# Patient Record
Sex: MALE | Race: WHITE | NOT HISPANIC OR LATINO
[De-identification: names, ages, dates, MRNs, and addresses within clinical notes are randomized per-mention and may not be internally consistent; named-entity substitution may affect disease eponyms.]

---

## 2017-05-08 ENCOUNTER — APPOINTMENT (OUTPATIENT)
Dept: CARDIOLOGY | Facility: CLINIC | Age: 82
End: 2017-05-08

## 2017-05-24 ENCOUNTER — APPOINTMENT (OUTPATIENT)
Dept: CARDIOLOGY | Facility: CLINIC | Age: 82
End: 2017-05-24

## 2017-06-02 ENCOUNTER — APPOINTMENT (OUTPATIENT)
Dept: CARDIOLOGY | Facility: CLINIC | Age: 82
End: 2017-06-02

## 2017-08-14 ENCOUNTER — APPOINTMENT (OUTPATIENT)
Dept: CARDIOLOGY | Facility: CLINIC | Age: 82
End: 2017-08-14
Payer: MEDICARE

## 2017-08-14 PROCEDURE — 93288 INTERROG EVL PM/LDLS PM IP: CPT

## 2017-10-03 ENCOUNTER — APPOINTMENT (OUTPATIENT)
Dept: CARDIOLOGY | Facility: CLINIC | Age: 82
End: 2017-10-03
Payer: MEDICARE

## 2017-10-03 PROCEDURE — 99214 OFFICE O/P EST MOD 30 MIN: CPT

## 2017-11-06 ENCOUNTER — APPOINTMENT (OUTPATIENT)
Dept: CARDIOLOGY | Facility: CLINIC | Age: 82
End: 2017-11-06
Payer: MEDICARE

## 2017-11-06 PROCEDURE — 93280 PM DEVICE PROGR EVAL DUAL: CPT

## 2017-12-12 ENCOUNTER — RX RENEWAL (OUTPATIENT)
Age: 82
End: 2017-12-12

## 2018-01-18 ENCOUNTER — CLINICAL ADVICE (OUTPATIENT)
Age: 83
End: 2018-01-18

## 2018-01-29 ENCOUNTER — APPOINTMENT (OUTPATIENT)
Dept: CARDIOLOGY | Facility: CLINIC | Age: 83
End: 2018-01-29

## 2018-04-26 ENCOUNTER — APPOINTMENT (OUTPATIENT)
Dept: CARDIOLOGY | Facility: CLINIC | Age: 83
End: 2018-04-26
Payer: MEDICARE

## 2018-04-26 VITALS
DIASTOLIC BLOOD PRESSURE: 78 MMHG | OXYGEN SATURATION: 99 % | RESPIRATION RATE: 16 BRPM | WEIGHT: 190 LBS | SYSTOLIC BLOOD PRESSURE: 132 MMHG | BODY MASS INDEX: 28.79 KG/M2 | HEART RATE: 76 BPM | HEIGHT: 68 IN

## 2018-04-26 DIAGNOSIS — Z87.438 PERSONAL HISTORY OF OTHER DISEASES OF MALE GENITAL ORGANS: ICD-10-CM

## 2018-04-26 DIAGNOSIS — Z85.46 PERSONAL HISTORY OF MALIGNANT NEOPLASM OF PROSTATE: ICD-10-CM

## 2018-04-26 DIAGNOSIS — Z86.79 PERSONAL HISTORY OF OTHER DISEASES OF THE CIRCULATORY SYSTEM: ICD-10-CM

## 2018-04-26 DIAGNOSIS — M25.50 PAIN IN UNSPECIFIED JOINT: ICD-10-CM

## 2018-04-26 DIAGNOSIS — Z86.39 PERSONAL HISTORY OF OTHER ENDOCRINE, NUTRITIONAL AND METABOLIC DISEASE: ICD-10-CM

## 2018-04-26 PROCEDURE — 99215 OFFICE O/P EST HI 40 MIN: CPT

## 2018-05-14 ENCOUNTER — APPOINTMENT (OUTPATIENT)
Dept: CARDIOLOGY | Facility: CLINIC | Age: 83
End: 2018-05-14
Payer: MEDICARE

## 2018-05-14 PROCEDURE — 93306 TTE W/DOPPLER COMPLETE: CPT

## 2018-05-14 PROCEDURE — 93280 PM DEVICE PROGR EVAL DUAL: CPT

## 2018-05-15 ENCOUNTER — APPOINTMENT (OUTPATIENT)
Dept: CARDIOLOGY | Facility: CLINIC | Age: 83
End: 2018-05-15

## 2018-05-24 ENCOUNTER — NON-APPOINTMENT (OUTPATIENT)
Age: 83
End: 2018-05-24

## 2018-05-24 ENCOUNTER — APPOINTMENT (OUTPATIENT)
Dept: CARDIOLOGY | Facility: CLINIC | Age: 83
End: 2018-05-24
Payer: MEDICARE

## 2018-05-24 VITALS
HEART RATE: 78 BPM | SYSTOLIC BLOOD PRESSURE: 122 MMHG | WEIGHT: 195 LBS | BODY MASS INDEX: 29.55 KG/M2 | HEIGHT: 68 IN | DIASTOLIC BLOOD PRESSURE: 78 MMHG

## 2018-05-24 PROCEDURE — 93000 ELECTROCARDIOGRAM COMPLETE: CPT

## 2018-05-24 PROCEDURE — 99214 OFFICE O/P EST MOD 30 MIN: CPT

## 2018-05-24 RX ORDER — SIMVASTATIN 20 MG/1
20 TABLET, FILM COATED ORAL
Refills: 0 | Status: DISCONTINUED | COMMUNITY
End: 2018-05-24

## 2018-05-29 PROCEDURE — 93224 XTRNL ECG REC UP TO 48 HRS: CPT

## 2018-05-31 ENCOUNTER — APPOINTMENT (OUTPATIENT)
Dept: CARDIOLOGY | Facility: CLINIC | Age: 83
End: 2018-05-31

## 2018-06-04 ENCOUNTER — RECORD ABSTRACTING (OUTPATIENT)
Age: 83
End: 2018-06-04

## 2018-06-05 ENCOUNTER — RX RENEWAL (OUTPATIENT)
Age: 83
End: 2018-06-05

## 2018-06-05 ENCOUNTER — RECORD ABSTRACTING (OUTPATIENT)
Age: 83
End: 2018-06-05

## 2018-06-05 ENCOUNTER — APPOINTMENT (OUTPATIENT)
Dept: CARDIOLOGY | Facility: CLINIC | Age: 83
End: 2018-06-05
Payer: MEDICARE

## 2018-06-05 VITALS
BODY MASS INDEX: 27.89 KG/M2 | OXYGEN SATURATION: 95 % | HEIGHT: 68 IN | DIASTOLIC BLOOD PRESSURE: 80 MMHG | HEART RATE: 74 BPM | SYSTOLIC BLOOD PRESSURE: 114 MMHG | WEIGHT: 184 LBS

## 2018-06-05 VITALS
OXYGEN SATURATION: 95 % | BODY MASS INDEX: 27.89 KG/M2 | SYSTOLIC BLOOD PRESSURE: 114 MMHG | HEIGHT: 68 IN | DIASTOLIC BLOOD PRESSURE: 80 MMHG | WEIGHT: 184 LBS | HEART RATE: 74 BPM

## 2018-06-05 DIAGNOSIS — I36.8 OTHER NONRHEUMATIC TRICUSPID VALVE DISORDERS: ICD-10-CM

## 2018-06-05 PROCEDURE — 99214 OFFICE O/P EST MOD 30 MIN: CPT

## 2018-06-05 PROCEDURE — 93000 ELECTROCARDIOGRAM COMPLETE: CPT

## 2018-06-05 RX ORDER — HYDROCODONE POLISTIREX AND CHLORPHENIRAMINE POLISTIREX 10; 8 MG/5ML; MG/5ML
10-8 SUSPENSION, EXTENDED RELEASE ORAL
Qty: 60 | Refills: 0 | Status: DISCONTINUED | COMMUNITY
Start: 2017-12-20 | End: 2018-06-05

## 2018-06-05 RX ORDER — PREDNISONE 10 MG/1
10 TABLET ORAL
Qty: 30 | Refills: 0 | Status: DISCONTINUED | COMMUNITY
Start: 2018-04-26 | End: 2018-06-05

## 2018-06-05 RX ORDER — TRAMADOL HYDROCHLORIDE AND ACETAMINOPHEN 37.5; 325 MG/1; MG/1
37.5-325 TABLET, FILM COATED ORAL
Qty: 20 | Refills: 0 | Status: DISCONTINUED | COMMUNITY
Start: 2018-02-08 | End: 2018-06-05

## 2018-06-05 RX ORDER — POLYETHYLENE GLYCOL 3350 17 G/17G
17 POWDER, FOR SOLUTION ORAL
Qty: 14 | Refills: 0 | Status: DISCONTINUED | COMMUNITY
Start: 2018-04-23 | End: 2018-06-05

## 2018-06-05 RX ORDER — METHYLPREDNISOLONE 4 MG/1
4 TABLET ORAL
Qty: 21 | Refills: 0 | Status: DISCONTINUED | COMMUNITY
Start: 2018-04-05 | End: 2018-06-05

## 2018-06-05 RX ORDER — MULTIVIT-MIN/IRON/FOLIC ACID/K 18-600-40
400 CAPSULE ORAL
Refills: 0 | Status: DISCONTINUED | COMMUNITY
End: 2018-06-05

## 2018-06-05 RX ORDER — PREDNISONE 20 MG/1
20 TABLET ORAL
Qty: 40 | Refills: 0 | Status: DISCONTINUED | COMMUNITY
Start: 2018-04-30 | End: 2018-06-05

## 2018-06-06 PROBLEM — I36.8 OTHER NONRHEUMATIC TRICUSPID VALVE DISORDERS: Status: ACTIVE | Noted: 2018-04-26

## 2018-06-07 ENCOUNTER — RX RENEWAL (OUTPATIENT)
Age: 83
End: 2018-06-07

## 2018-06-19 ENCOUNTER — APPOINTMENT (OUTPATIENT)
Dept: ELECTROPHYSIOLOGY | Facility: CLINIC | Age: 83
End: 2018-06-19

## 2018-08-17 ENCOUNTER — RECORD ABSTRACTING (OUTPATIENT)
Age: 83
End: 2018-08-17

## 2018-08-20 ENCOUNTER — APPOINTMENT (OUTPATIENT)
Dept: CARDIOLOGY | Facility: CLINIC | Age: 83
End: 2018-08-20
Payer: MEDICARE

## 2018-08-20 PROCEDURE — 93280 PM DEVICE PROGR EVAL DUAL: CPT

## 2018-09-04 ENCOUNTER — MEDICATION RENEWAL (OUTPATIENT)
Age: 83
End: 2018-09-04

## 2018-11-14 ENCOUNTER — APPOINTMENT (OUTPATIENT)
Dept: CARDIOLOGY | Facility: CLINIC | Age: 83
End: 2018-11-14
Payer: MEDICARE

## 2018-11-14 PROCEDURE — 93280 PM DEVICE PROGR EVAL DUAL: CPT

## 2018-11-14 NOTE — PROCEDURE
[See Scanned Paceart Report] : See scanned paceart report [de-identified] : See Paceart report 11/14/18

## 2018-11-27 ENCOUNTER — MEDICATION RENEWAL (OUTPATIENT)
Age: 83
End: 2018-11-27

## 2018-11-27 ENCOUNTER — APPOINTMENT (OUTPATIENT)
Dept: CARDIOLOGY | Facility: CLINIC | Age: 83
End: 2018-11-27
Payer: MEDICARE

## 2018-11-27 ENCOUNTER — RECORD ABSTRACTING (OUTPATIENT)
Age: 83
End: 2018-11-27

## 2018-11-27 VITALS
WEIGHT: 196 LBS | OXYGEN SATURATION: 98 % | HEART RATE: 79 BPM | SYSTOLIC BLOOD PRESSURE: 120 MMHG | HEIGHT: 68 IN | BODY MASS INDEX: 29.7 KG/M2 | DIASTOLIC BLOOD PRESSURE: 80 MMHG

## 2018-11-27 DIAGNOSIS — N40.0 BENIGN PROSTATIC HYPERPLASIA WITHOUT LOWER URINARY TRACT SYMPMS: ICD-10-CM

## 2018-11-27 PROCEDURE — 99214 OFFICE O/P EST MOD 30 MIN: CPT

## 2018-11-27 RX ORDER — MAGNESIUM OXIDE 400 MG
400 (241.3 MG) TABLET ORAL
Qty: 28 | Refills: 0 | Status: DISCONTINUED | COMMUNITY
Start: 2018-04-23 | End: 2018-11-27

## 2018-11-27 RX ORDER — CLOTRIMAZOLE AND BETAMETHASONE DIPROPIONATE 10; .5 MG/G; MG/G
1-0.05 CREAM TOPICAL TWICE DAILY
Qty: 60 | Refills: 3 | Status: ACTIVE | COMMUNITY
Start: 2017-05-19 | End: 1900-01-01

## 2018-12-10 ENCOUNTER — RX RENEWAL (OUTPATIENT)
Age: 83
End: 2018-12-10

## 2019-02-15 ENCOUNTER — APPOINTMENT (OUTPATIENT)
Dept: CARDIOLOGY | Facility: CLINIC | Age: 84
End: 2019-02-15
Payer: MEDICARE

## 2019-02-15 PROCEDURE — 93296 REM INTERROG EVL PM/IDS: CPT

## 2019-02-15 PROCEDURE — 93294 REM INTERROG EVL PM/LDLS PM: CPT

## 2019-02-15 NOTE — PROCEDURE
[Pacemaker] : pacemaker [DDDR] : DDDR [Voltage: ___ volts] : Voltage was [unfilled] volts [Longevity: ___ months] : The estimated remaining battery life is [unfilled] months [Lead Imp:  ___ohms] : lead impedance was [unfilled] ohms [Sensing Amplitude ___mv] : sensing amplitude was [unfilled] mv [___V @] : [unfilled] V [___ ms] : [unfilled] ms [None] : none [Apace-Vpace ___ %] : Apace-Vpace [unfilled]% [de-identified] : Medtronic [de-identified] : Sensia [id number] [de-identified] : [id number] [de-identified] : 8-16-10 [de-identified] :  [de-identified] : [employer]\par \par atrium\par sensing 0.50mv\par amplitude 1.50v\par duration 0.40ms\par \par Ventricle\par sensing 4.0mv\par amplitude 2.0v\par duration 0.40ms\par \par Episodes AHR x 5\par longest on 11-18-18 lasting 2hours 45min 13 seconds with avg v rate 76bpm\par fastest avg v rate was on 12-17-18 x 1min 18 seconds with avg v rate 108bpm\par most recent 2-7-19 lasting 1 hour 43 minutes 30 seconds with avg v rate 77bpm\par \par Episodes seem to be atrial fibrillation with controlled v rate\par \par The risk benefit ratio of AC vs novel AC was reviewed with the patient. He would like to start Eliquis 5mg 1 tab po BID. \par He will decrease his Asa to 81mg daily and start OTC PPI for GI protection. \par He will follow closely with his internest in [place], next week. As he is in [place] until May. He verbalizes understanding and the importance of following next week. \par \par Next ppm check in office 3 months to follow ppm in office. \par \par Sincerely,\par \par Shala Gomez PA-C\par patient reviewed and plan advised by supervising physician Dr. Alexander\par \par

## 2019-05-10 ENCOUNTER — MEDICATION RENEWAL (OUTPATIENT)
Age: 84
End: 2019-05-10

## 2019-05-13 ENCOUNTER — MEDICATION RENEWAL (OUTPATIENT)
Age: 84
End: 2019-05-13

## 2019-05-16 ENCOUNTER — MEDICATION RENEWAL (OUTPATIENT)
Age: 84
End: 2019-05-16

## 2019-05-17 ENCOUNTER — APPOINTMENT (OUTPATIENT)
Dept: CARDIOLOGY | Facility: CLINIC | Age: 84
End: 2019-05-17
Payer: MEDICARE

## 2019-05-17 PROCEDURE — 93880 EXTRACRANIAL BILAT STUDY: CPT

## 2019-05-17 PROCEDURE — 93306 TTE W/DOPPLER COMPLETE: CPT

## 2019-05-17 PROCEDURE — 93280 PM DEVICE PROGR EVAL DUAL: CPT

## 2019-05-17 NOTE — PROCEDURE
[Pacemaker] : pacemaker [DDDR] : DDDR [Voltage: ___ volts] : Voltage was [unfilled] volts [Longevity: ___ months] : The estimated remaining battery life is [unfilled] months [Lead Imp:  ___ohms] : lead impedance was [unfilled] ohms [Sensing Amplitude ___mv] : sensing amplitude was [unfilled] mv [___V @] : [unfilled] V [___ ms] : [unfilled] ms [None] : none [Apace-Vpace ___ %] : Apace-Vpace [unfilled]% [No] : not [See Device Printout] : See device printout [Threshold Testing Performed] : Threshold testing was performed [Atrial] : Atrial [Counters Reset] : the counters were reset [Ventricular] : Ventricular [de-identified] : Medtronic [de-identified] : Sensia [id number] [de-identified] : 8-16-10 [de-identified] : [id number] [de-identified] :  [de-identified] : Settings\par \par atrium (auto)\par sensing 0.50mv\par amplitude 1.50v\par duration 0.40ms\par \par Ventricle (auto)\par sensing 4.0mv\par amplitude 2.0v\par duration 0.40ms\par \par Episodes AHR x 1 since last remote check 2/15/19\par Viewed episode, lasted 2h 23min, max v-rate during episode was 148bpm, avg v-rate 80 bpm. Asx. \par On beta blocker and NOAC. \par \par Overall, PAF with controlled v rate.\par \par Next ppm check in office 3 months. Patient prefers in office checks while he is in state. \par \par Sincerely,\par \par ADELINE Zuniga\par Reviewed with supervising MD: Dr. Bari Paulson \par

## 2019-05-22 ENCOUNTER — TRANSCRIPTION ENCOUNTER (OUTPATIENT)
Age: 84
End: 2019-05-22

## 2019-06-14 ENCOUNTER — OTHER (OUTPATIENT)
Age: 84
End: 2019-06-14

## 2019-06-14 ENCOUNTER — MEDICATION RENEWAL (OUTPATIENT)
Age: 84
End: 2019-06-14

## 2019-06-14 RX ORDER — ASPIRIN 81 MG
81 TABLET, DELAYED RELEASE (ENTERIC COATED) ORAL
Refills: 0 | Status: DISCONTINUED | COMMUNITY
End: 2019-06-14

## 2019-06-26 ENCOUNTER — APPOINTMENT (OUTPATIENT)
Dept: CARDIOLOGY | Facility: CLINIC | Age: 84
End: 2019-06-26
Payer: MEDICARE

## 2019-06-26 ENCOUNTER — NON-APPOINTMENT (OUTPATIENT)
Age: 84
End: 2019-06-26

## 2019-06-26 VITALS — HEART RATE: 83 BPM | SYSTOLIC BLOOD PRESSURE: 126 MMHG | DIASTOLIC BLOOD PRESSURE: 70 MMHG | HEIGHT: 68 IN

## 2019-06-26 DIAGNOSIS — R60.9 EDEMA, UNSPECIFIED: ICD-10-CM

## 2019-06-26 DIAGNOSIS — Z86.79 PERSONAL HISTORY OF OTHER DISEASES OF THE CIRCULATORY SYSTEM: ICD-10-CM

## 2019-06-26 PROCEDURE — 99214 OFFICE O/P EST MOD 30 MIN: CPT

## 2019-06-26 PROCEDURE — 93000 ELECTROCARDIOGRAM COMPLETE: CPT

## 2019-06-26 NOTE — DISCUSSION/SUMMARY
[FreeTextEntry1] : Patient will consult Dr. Fajardo regarding gradual decline in hemoglobin as he is on Eliquis. Aspirin has been stopped. If needed, GI consultation is recommended for his anemia. Patient with history of PAF, asymptomatic\par \par History of CAD. Asymptomatic. Normal LV function. Check echocardiogram in future. Previous echocardiogram in May 2018 showed normal LV function.\par \par Lipid profile: Controlled with low-dose statins. No side effects. \par \par Continue current medications

## 2019-06-26 NOTE — PHYSICAL EXAM
[Normal Appearance] : normal appearance [Normal Oropharynx] : normal oropharynx [Normal Conjunctiva] : the conjunctiva exhibited no abnormalities [Normal Jugular Venous V Waves Present] : normal jugular venous V waves present [] : no respiratory distress [Respiration, Rhythm And Depth] : normal respiratory rhythm and effort [Heart Sounds] : normal S1 and S2 [Heart Rate And Rhythm] : heart rate and rhythm were normal [Edema] : no peripheral edema present [Bowel Sounds] : normal bowel sounds [Abdomen Soft] : soft [Abdomen Mass (___ Cm)] : no abdominal mass palpated [Abnormal Walk] : normal gait [Cyanosis, Localized] : no localized cyanosis [Nail Clubbing] : no clubbing of the fingernails [Skin Color & Pigmentation] : normal skin color and pigmentation [Skin Turgor] : normal skin turgor [Oriented To Time, Place, And Person] : oriented to person, place, and time

## 2019-06-26 NOTE — HISTORY OF PRESENT ILLNESS
[FreeTextEntry1] : Alvarez is a pleasant 85-year-old male with history of intermittent atrial fibrillation on liquids, CAD, status post CABG,  nonobstructive carotid disease, hypercholesteremia on statins He has history of coronary artery bypass graft surgery. He had LIMA to LAD, SVG to PDA, OMB and OM2 in 1990. \par \par He denies any chest pain on exertion or at rest. No orthopnea or PND. No fever or chills. \par \par His blood pressure is under good control on present medical regimen. \par \par He was noted to have slight anemia. Patient is on Eliquis. He consulted Dr. Fajardo. Eliquis was  continued. He is off aspirin. He denies bright red bleeding in stools or melena. Last hemoglobin  is 10.2

## 2019-06-26 NOTE — ASSESSMENT
[FreeTextEntry1] : review today:\par \par EKG 06/26/19:\par Carotid ultrasound May 2019: Screws without significant stenosis.\par Pacemaker check May 2019 - Episodes of high atrial rate Suggestive of PAF -

## 2019-08-27 ENCOUNTER — APPOINTMENT (OUTPATIENT)
Dept: CARDIOLOGY | Facility: CLINIC | Age: 84
End: 2019-08-27
Payer: MEDICARE

## 2019-08-27 ENCOUNTER — TRANSCRIPTION ENCOUNTER (OUTPATIENT)
Age: 84
End: 2019-08-27

## 2019-08-27 PROCEDURE — 93280 PM DEVICE PROGR EVAL DUAL: CPT

## 2019-08-27 RX ORDER — DOXAZOSIN 4 MG/1
4 TABLET ORAL
Qty: 90 | Refills: 3 | Status: ACTIVE | COMMUNITY
Start: 2018-04-26 | End: 1900-01-01

## 2019-08-27 NOTE — PROCEDURE
[No] : not [See Device Printout] : See device printout [Pacemaker] : pacemaker [DDDR] : DDDR [Voltage: ___ volts] : Voltage was [unfilled] volts [Atrial] : Atrial [Threshold Testing Performed] : Threshold testing was performed [Ventricular] : Ventricular [Lead Imp:  ___ohms] : lead impedance was [unfilled] ohms [Sensing Amplitude ___mv] : sensing amplitude was [unfilled] mv [___V @] : [unfilled] V [___ ms] : [unfilled] ms [None] : none [Counters Reset] : the counters were reset [Apace-Vpace ___ %] : Apace-Vpace [unfilled]% [de-identified] : Medtronic [de-identified] : Sensia [id number] [de-identified] : [id number] [de-identified] : 8-16-10 [de-identified] :  [de-identified] : Settings\par \par atrium (auto)\par sensing 0.50mv\par amplitude 1.50v\par duration 0.40ms\par \par Ventricle (auto)\par sensing 4.0mv\par amplitude 2.0v\par duration 0.40ms\par \par mode switch <0.1%\par On beta blocker and NOAC. \par Recorded events reviewed - overall ventricular rates controlled and short duration\par \par Next ppm check in office 6 months and 3 month remote \par \par Sincerely,\par \par Nathalie Grant PA-C\par Patients history, testing and plan reviewed with supervising MD: Dr. Tete Jackson  [de-identified] : est 7-26 mos

## 2019-09-03 ENCOUNTER — MEDICATION RENEWAL (OUTPATIENT)
Age: 84
End: 2019-09-03

## 2019-09-10 ENCOUNTER — APPOINTMENT (OUTPATIENT)
Dept: CARDIOLOGY | Facility: CLINIC | Age: 84
End: 2019-09-10
Payer: MEDICARE

## 2019-09-10 PROCEDURE — 93294 REM INTERROG EVL PM/LDLS PM: CPT

## 2019-09-11 NOTE — PROCEDURE
[No] : not [See Device Printout] : See device printout [Pacemaker] : pacemaker [DDDR] : DDDR [Voltage: ___ volts] : Voltage was [unfilled] volts [Atrial] : Atrial [Threshold Testing Performed] : Threshold testing was performed [Ventricular] : Ventricular [Sensing Amplitude ___mv] : sensing amplitude was [unfilled] mv [Lead Imp:  ___ohms] : lead impedance was [unfilled] ohms [___V @] : [unfilled] V [___ ms] : [unfilled] ms [None] : none [Apace-Vpace ___ %] : Apace-Vpace [unfilled]% [de-identified] : Medtronic [de-identified] : [id number] [de-identified] : Sensia [id number] [de-identified] : 8-16-10 [de-identified] : est 6-41 mos [de-identified] :  [de-identified] : Settings\par \par atrium (auto)\par sensing 0.50mv\par amplitude 1.50v\par duration 0.40ms\par \par Ventricle (auto)\par sensing 4.0mv\par amplitude 2.0v\par duration 0.40ms\par \par mode switch <0.1%\par Recorded AHR events reviewed - overall ventricular rates controlled and short duration\par On beta blocker and NOAC. \par \par Next ppm check in office 6 months and 3 month remote \par \par Sincerely,\par \par SARA Zuniga-C\par Supervising MD: Dr. Tete Jackson

## 2019-11-15 ENCOUNTER — APPOINTMENT (OUTPATIENT)
Dept: CARDIOLOGY | Facility: CLINIC | Age: 84
End: 2019-11-15

## 2019-12-11 ENCOUNTER — MEDICATION RENEWAL (OUTPATIENT)
Age: 84
End: 2019-12-11

## 2020-01-01 ENCOUNTER — APPOINTMENT (OUTPATIENT)
Dept: CARDIOLOGY | Facility: CLINIC | Age: 85
End: 2020-01-01

## 2020-01-01 ENCOUNTER — APPOINTMENT (OUTPATIENT)
Dept: CARDIOLOGY | Facility: CLINIC | Age: 85
End: 2020-01-01
Payer: MEDICARE

## 2020-01-01 ENCOUNTER — RX RENEWAL (OUTPATIENT)
Age: 85
End: 2020-01-01

## 2020-01-01 ENCOUNTER — TRANSCRIPTION ENCOUNTER (OUTPATIENT)
Age: 85
End: 2020-01-01

## 2020-01-01 VITALS
OXYGEN SATURATION: 96 % | WEIGHT: 197 LBS | SYSTOLIC BLOOD PRESSURE: 118 MMHG | HEIGHT: 68 IN | BODY MASS INDEX: 29.86 KG/M2 | DIASTOLIC BLOOD PRESSURE: 64 MMHG | HEART RATE: 84 BPM

## 2020-01-01 VITALS
WEIGHT: 200 LBS | BODY MASS INDEX: 30.31 KG/M2 | DIASTOLIC BLOOD PRESSURE: 64 MMHG | HEIGHT: 68 IN | OXYGEN SATURATION: 97 % | TEMPERATURE: 98.2 F | HEART RATE: 74 BPM | SYSTOLIC BLOOD PRESSURE: 130 MMHG

## 2020-01-01 PROCEDURE — 99214 OFFICE O/P EST MOD 30 MIN: CPT

## 2020-01-01 PROCEDURE — 93280 PM DEVICE PROGR EVAL DUAL: CPT

## 2020-01-01 PROCEDURE — 93306 TTE W/DOPPLER COMPLETE: CPT

## 2020-01-01 PROCEDURE — ZZZZZ: CPT

## 2020-01-01 PROCEDURE — 93294 REM INTERROG EVL PM/LDLS PM: CPT

## 2020-01-01 RX ORDER — APIXABAN 5 MG/1
5 TABLET, FILM COATED ORAL
Qty: 180 | Refills: 3 | Status: ACTIVE | COMMUNITY
Start: 2019-02-15 | End: 1900-01-01

## 2020-01-01 RX ORDER — NIFEDIPINE 30 MG/1
30 TABLET, FILM COATED, EXTENDED RELEASE ORAL DAILY
Qty: 90 | Refills: 1 | Status: ACTIVE | COMMUNITY
Start: 1900-01-01 | End: 1900-01-01

## 2020-01-01 RX ORDER — PREDNISONE 1 MG/1
1 TABLET ORAL
Refills: 0 | Status: DISCONTINUED | COMMUNITY
End: 2020-01-01

## 2020-01-01 RX ORDER — DOXYCYCLINE HYCLATE 100 MG/1
100 CAPSULE ORAL
Qty: 14 | Refills: 0 | Status: DISCONTINUED | COMMUNITY
Start: 2020-01-01 | End: 2020-01-01

## 2020-01-01 RX ORDER — PREDNISONE 5 MG/1
5 TABLET ORAL DAILY
Refills: 0 | Status: DISCONTINUED | COMMUNITY
End: 2020-01-01

## 2020-01-01 RX ORDER — CHLORHEXIDINE GLUCONATE, 0.12% ORAL RINSE 1.2 MG/ML
0.12 SOLUTION DENTAL
Qty: 473 | Refills: 0 | Status: DISCONTINUED | COMMUNITY
Start: 2020-01-01 | End: 2020-01-01

## 2020-01-01 RX ORDER — SIMVASTATIN 20 MG/1
20 TABLET, FILM COATED ORAL
Qty: 90 | Refills: 3 | Status: DISCONTINUED | COMMUNITY
Start: 2018-12-10 | End: 2020-01-01

## 2020-01-01 RX ORDER — MUPIROCIN 2 G/100G
2 CREAM TOPICAL
Qty: 30 | Refills: 0 | Status: ACTIVE | COMMUNITY
Start: 2020-01-01

## 2020-01-03 ENCOUNTER — APPOINTMENT (OUTPATIENT)
Dept: CARDIOLOGY | Facility: CLINIC | Age: 85
End: 2020-01-03
Payer: MEDICARE

## 2020-01-03 PROCEDURE — 93294 REM INTERROG EVL PM/LDLS PM: CPT

## 2020-01-03 PROCEDURE — 93296 REM INTERROG EVL PM/IDS: CPT

## 2020-01-03 NOTE — PROCEDURE
[No] : not [See Device Printout] : See device printout [DDDR] : DDDR [Pacemaker] : pacemaker [Voltage: ___ volts] : Voltage was [unfilled] volts [Threshold Testing Performed] : Threshold testing was performed [Atrial] : Atrial [Ventricular] : Ventricular [Lead Imp:  ___ohms] : lead impedance was [unfilled] ohms [___V @] : [unfilled] V [Sensing Amplitude ___mv] : sensing amplitude was [unfilled] mv [___ ms] : [unfilled] ms [Apace-Vpace ___ %] : Apace-Vpace [unfilled]% [None] : none [de-identified] : Medtronic [de-identified] : Sensia [id number] [de-identified] : [id number] [de-identified] : 8-16-10 [de-identified] :  [de-identified] : 22months [de-identified] : Settings\par \par atrium \par sensing 0.50mv\par amplitude 1.50v\par duration 0.40ms\par \par Ventricle \par sensing 5.6mv\par amplitude 2.0v\par duration 0.40ms\par \par mode switch <0.1%\par Recorded AHR events with EGM's reviewed - overall ventricular rates controlled by histogram and short duration (longest 3min 9 sec with avg v rate 113bpm, majority <1min)\par VHR x 1 on 11-5-19 (not full EGM) x 5 beats\par On beta blocker, nifedipine and Eliquis. \par \par labs/tests\par echo 5/19 ef 60%\par 11-8-19 K 3.5\par reviewed with Dr. Faria, no changes advised. \par \par Next ppm check in office 3 months and 6 month remote. Pt missed Nov ov with cardiologist, to reschedule. \par \par Sincerely,\par \par ADELINE Zuniga\par Supervising MD: Dr. Tete Jackson

## 2020-03-02 RX ORDER — CARVEDILOL 6.25 MG/1
6.25 TABLET, FILM COATED ORAL
Qty: 135 | Refills: 3 | Status: ACTIVE | COMMUNITY
Start: 2018-04-26 | End: 1900-01-01

## 2020-06-29 NOTE — PHYSICAL EXAM
[Well Groomed] : well groomed [General Appearance - Well Developed] : well developed [Normal Appearance] : normal appearance [General Appearance - Well Nourished] : well nourished [No Deformities] : no deformities [Eyelids - No Xanthelasma] : the eyelids demonstrated no xanthelasmas [General Appearance - In No Acute Distress] : no acute distress [Normal Conjunctiva] : the conjunctiva exhibited no abnormalities [Normal Oral Mucosa] : normal oral mucosa [No Oral Pallor] : no oral pallor [No Oral Cyanosis] : no oral cyanosis [Normal Jugular Venous A Waves Present] : normal jugular venous A waves present [No Jugular Venous Zhang A Waves] : no jugular venous zhang A waves [Normal Jugular Venous V Waves Present] : normal jugular venous V waves present [Auscultation Breath Sounds / Voice Sounds] : lungs were clear to auscultation bilaterally [Respiration, Rhythm And Depth] : normal respiratory rhythm and effort [Exaggerated Use Of Accessory Muscles For Inspiration] : no accessory muscle use [Heart Sounds] : normal S1 and S2 [Heart Rate And Rhythm] : heart rate and rhythm were normal [Murmurs] : no murmurs present [Abdomen Tenderness] : non-tender [Abdomen Soft] : soft [Abdomen Mass (___ Cm)] : no abdominal mass palpated [Abnormal Walk] : normal gait [Nail Clubbing] : no clubbing of the fingernails [Gait - Sufficient For Exercise Testing] : the gait was sufficient for exercise testing [Petechial Hemorrhages (___cm)] : no petechial hemorrhages [Cyanosis, Localized] : no localized cyanosis [Skin Color & Pigmentation] : normal skin color and pigmentation [] : no rash [No Venous Stasis] : no venous stasis [Skin Lesions] : no skin lesions [No Skin Ulcers] : no skin ulcer [No Xanthoma] : no  xanthoma was observed [Oriented To Time, Place, And Person] : oriented to person, place, and time [Affect] : the affect was normal [Mood] : the mood was normal [No Anxiety] : not feeling anxious [FreeTextEntry1] : +2 BLLE edema

## 2020-06-29 NOTE — PROCEDURE
[NSR] : normal sinus rhythm [No] : not [Pacemaker] : pacemaker [DDD] : DDD [Lead Imp:  ___ohms] : lead impedance was [unfilled] ohms [Sensing Amplitude ___mv] : sensing amplitude was [unfilled] mv [___V @] : [unfilled] V [___ ms] : [unfilled] ms [de-identified] : Medtronic [de-identified] : Sensia [id number]. [de-identified] : 8-16-10 [de-identified] : [id number] [de-identified] :  [de-identified] : Battery longevity: 2 months - 17 months [de-identified] : AP 76.2,  5.3%\par \par No events\par \par Settings:\par \par atrium \par sensing 0.50mv\par amplitude 1.75v\par duration 0.40ms\par \par Ventricle \par sensing 4mv\par amplitude 2.0v\par duration 0.40ms\par \par F/U in 2 months for device check. Note battery longevity.\par Discussed red flag symptoms, which would warrant sooner or emergent medical evaluation.\par Any questions and concerns were addressed and resolved.\par \par Sincerely,\par Nataliya Vazquez FNP-BC\par Patient's history, testing, and plan was reviewed with supervising physician, Dr. Ricky Vilchis\par

## 2020-06-29 NOTE — PROCEDURE
[NSR] : normal sinus rhythm [No] : not [Pacemaker] : pacemaker [DDD] : DDD [Lead Imp:  ___ohms] : lead impedance was [unfilled] ohms [Sensing Amplitude ___mv] : sensing amplitude was [unfilled] mv [___V @] : [unfilled] V [___ ms] : [unfilled] ms [de-identified] : Medtronic [de-identified] : Sensia [id number]. [de-identified] : 8-16-10 [de-identified] :  [de-identified] : [id number] [de-identified] : Battery longevity: 2 months - 17 months [de-identified] : AP 76.2,  5.3%\par \par No events\par \par Settings:\par \par atrium \par sensing 0.50mv\par amplitude 1.75v\par duration 0.40ms\par \par Ventricle \par sensing 4mv\par amplitude 2.0v\par duration 0.40ms\par \par F/U in 2 months for device check. Note battery longevity.\par Discussed red flag symptoms, which would warrant sooner or emergent medical evaluation.\par Any questions and concerns were addressed and resolved.\par \par Sincerely,\par Nataliya Vazquez FNP-BC\par Patient's history, testing, and plan was reviewed with supervising physician, Dr. Ricky Vilchis\par

## 2020-06-29 NOTE — ASSESSMENT
[FreeTextEntry1] : ARLENE MAS JR is a 86 year old M who presents today Jun 29, 2020 with the above history and the following active issues:\par \par Atrial fibrillation on AC: Paroxysmal. No events on today's device check. No complaints of bleeding.\par \par CAD s/p CABG: Not on ASA secondary to anemia. Continue Coreg. Continue Procardia XL. Continue Cardura.\par \par Lower extremity edema: Obtain 2d echocardiogram to evaluate resting heart structure, valvular function, and LVEF. Monitor LVOT. Low salt diet.\par \par HTN: Well controlled today. Meds as above.\par \par HLD: Tolerating Lipitor.\par \par Anemia: Following with PCP Dr. Fajardo.\par \par Ongoing f/u with Dr. Bravo.\par \par F/U after testing to review results of echo and have device check same day.\par Discussed red flag symptoms, which would warrant sooner or emergent medical evaluation.\par Any questions and concerns were addressed and resolved.\par \par Sincerely,\par Nataliya Vazquez FNP-BC\par Patient's history, testing, and plan was reviewed with supervising physician, Dr. Ricky Vilchis

## 2020-06-29 NOTE — PHYSICAL EXAM
[General Appearance - Well Developed] : well developed [Well Groomed] : well groomed [Normal Appearance] : normal appearance [No Deformities] : no deformities [General Appearance - Well Nourished] : well nourished [Eyelids - No Xanthelasma] : the eyelids demonstrated no xanthelasmas [General Appearance - In No Acute Distress] : no acute distress [Normal Conjunctiva] : the conjunctiva exhibited no abnormalities [No Oral Pallor] : no oral pallor [Normal Oral Mucosa] : normal oral mucosa [No Oral Cyanosis] : no oral cyanosis [Normal Jugular Venous A Waves Present] : normal jugular venous A waves present [No Jugular Venous Zhang A Waves] : no jugular venous zhang A waves [Normal Jugular Venous V Waves Present] : normal jugular venous V waves present [Respiration, Rhythm And Depth] : normal respiratory rhythm and effort [Exaggerated Use Of Accessory Muscles For Inspiration] : no accessory muscle use [Auscultation Breath Sounds / Voice Sounds] : lungs were clear to auscultation bilaterally [Heart Sounds] : normal S1 and S2 [Heart Rate And Rhythm] : heart rate and rhythm were normal [Murmurs] : no murmurs present [Abdomen Soft] : soft [Abdomen Tenderness] : non-tender [Abdomen Mass (___ Cm)] : no abdominal mass palpated [Abnormal Walk] : normal gait [Gait - Sufficient For Exercise Testing] : the gait was sufficient for exercise testing [Nail Clubbing] : no clubbing of the fingernails [Cyanosis, Localized] : no localized cyanosis [Petechial Hemorrhages (___cm)] : no petechial hemorrhages [Skin Color & Pigmentation] : normal skin color and pigmentation [] : no rash [Skin Lesions] : no skin lesions [No Venous Stasis] : no venous stasis [No Skin Ulcers] : no skin ulcer [No Xanthoma] : no  xanthoma was observed [Affect] : the affect was normal [Oriented To Time, Place, And Person] : oriented to person, place, and time [Mood] : the mood was normal [No Anxiety] : not feeling anxious [FreeTextEntry1] : +2 BLLE edema

## 2020-06-29 NOTE — HISTORY OF PRESENT ILLNESS
[FreeTextEntry1] : ARLENE MAS JR is a 86 year old male with a past medical history of atrial fibrillation on AC, CAD s/p CABG, nonobstructive carotid dz, HTN, HLD. He has history of coronary artery bypass graft surgery. He had LIMA to LAD, SVG to PDA, OMB and OM2 in 1990. Noted to have slight anemia. Off ASA. On Eliquis. Following with PCP Dr. Fajardo.\par \par Last seen for OV with Dr. Paulson 6/26/29. In the inteim he tripped, fractured his left hip and had a repair with Dr. Taylor. Was in [facility]. He denies chest pain, pressure, palpitations, unusual shortness of breath, orthopnea, LE edema, lightheadedness, dizziness, near syncope or syncope. Activity is limited. Walks with walker.\par \par Device check today demonstrates: Battery longevity 2 months - 17 months. Stable lead testing. No events. See device note for full details.\par \par Testing:\par \par Labs 6/15/20: Hgb 11.9, Hct 35.9, ESR 18, Trigs 94, Chol 134, LDL 46, HDL 69, ALT 17, AST 23, CRP 0.59, uric acid 6.8\par \par Echo 5/17/19: EF 60%. Mild MR. Minimal AR. Ascending aorta 4 cm. LVOT velocity time = 22cm. Mildly dilated LA. Mild concentric LVH. Moderate DD. Compared to echo of 5/14/18, no significant changes noted.\par \par Carotid u/s 5/17/2019: Nonobstructive dz.

## 2020-08-24 NOTE — DISCUSSION/SUMMARY
[FreeTextEntry1] : ARLENE MAS JR is a 86 year old M who presents today  with the above history and the following active issues:\par \par Paroxysmal atrial fibrillation on AC: Paroxysmal. No events on today's device check. No complaints of bleeding.  High risk medication use.  Follow CBC.  Watch for bleeding.  If creatinine increases to greater than 1.5 decrease dose of Eliquis to 2.5 mg twice daily.\par \par \par CAD s/p CABG: Not on ASA secondary to anemia. Continue Coreg. Continue Procardia XL. Continue Cardura.\par \par Lower extremity edema: Stable.  Mild pulmonary hypertension.  Stable nonrheumatic aortic and mitral disease.  Continue low-salt diet.  Leg elevation.  Possibility of nifedipine playing a role cannot be ruled out.  We will continue to monitor blood pressure.  And if remains stable nifedipine can be discontinued.\par \par HTN: Well controlled today. Meds as above.  No clinical signs of significant pulmonary congestion.  Mild signs of volume overload.  Continue low-salt diet.  Present medications.  Nifedipine can be discontinued if blood pressure remains stable.  Goal less than 130/80.  There is mild CKD.\par \par HLD: Tolerating Lipitor.\par \par Anemia: Following with PCP Dr. Fajardo.\par \par Medtronic dual-chamber pacemaker.  Normal functioning.  Brief episodes of atrial arrhythmias noted.  Continue to follow as it is closer to JOANNA.\par \par Counseling regarding low saturated fat, salt and carbohydrate intake was reviewed. Active lifestyle and regular. Exercise along with weight management is advised.\par All the above were at length reviewed. Answered all the questions. Thank you very much for this kind referral. Please do not hesitate to give me a call for any question.\par Part of this transcription was done with voice recognition software and phonetically similar errors are common. I apologize for that. Please do not hesitate to call for any questions due to above.\par

## 2020-08-24 NOTE — PROCEDURE
[No] : not [DDD] : DDD [Pacemaker] : pacemaker [NSR] : normal sinus rhythm [Lead Imp:  ___ohms] : lead impedance was [unfilled] ohms [Sensing Amplitude ___mv] : sensing amplitude was [unfilled] mv [___V @] : [unfilled] V [___ ms] : [unfilled] ms [de-identified] : Medtronic [de-identified] : Sensia [id number]. [de-identified] : [id number] [de-identified] : 8-16-10 [de-identified] :  [de-identified] : AP 75.9,  7.5%\par \par No events\par \par Settings:\par \par atrium \par sensing 0.50mv\par amplitude 1.5v\par duration 0.40ms\par \par Ventricle \par sensing 4mv\par amplitude 2.0v\par duration 0.40ms\par \par Remote interrogation in 6 weeks with an in office interrogation in 3 months\par \par Red flag symptoms which would warrant sooner emergent evaluation reviewed with the patient. \par Questions and concerns were addressed and answered. \par \par Sincerely,\par \par Nathalie Grant PA-C\par Patients history, testing and plan reviewed with supervising MD: Dr. Ricky Vilchis \par  [de-identified] : Battery longevity: 1 months - 28 months est 14 mos

## 2020-08-24 NOTE — ASSESSMENT
[FreeTextEntry1] : Prior testing for reference\par \par Labs 6/15/20: Hgb 11.9, Hct 35.9, ESR 18, Trigs 94, Chol 134, LDL 46, HDL 69, ALT 17, AST 23, CRP 0.59, uric acid 6.8\par \par Echo 5/17/19: EF 60%. Mild MR. Minimal AR. Ascending aorta 4 cm. LVOT velocity time = 22cm. Mildly dilated LA. Mild concentric LVH. Moderate DD. Compared to echo of 5/14/18, no significant changes noted.\par \par Carotid u/s 5/17/2019: Nonobstructive dz.\par \par Reviewed on August 24, 2020\par Echocardiogram which was done on August 24, 2020 was evaluated and reviewed with him.  LV ejection fraction 60% mild aortic stenosis mild aortic regurgitation mild mitral regurgitation mildly dilated left atrium concentric LVH moderate diastolic dysfunction.  PASP 35 mmHg\par Labs July 14, 2020.  Hemoglobin 12.4 platelet count 264 ESR 16 creatinine 1.32 potassium 3.8 sodium 142\par \par

## 2020-08-24 NOTE — PHYSICAL EXAM
[General Appearance - Well Developed] : well developed [Normal Appearance] : normal appearance [No Deformities] : no deformities [Well Groomed] : well groomed [General Appearance - Well Nourished] : well nourished [Normal Conjunctiva] : the conjunctiva exhibited no abnormalities [General Appearance - In No Acute Distress] : no acute distress [Normal Oral Mucosa] : normal oral mucosa [Eyelids - No Xanthelasma] : the eyelids demonstrated no xanthelasmas [No Oral Pallor] : no oral pallor [Normal Jugular Venous A Waves Present] : normal jugular venous A waves present [No Oral Cyanosis] : no oral cyanosis [No Jugular Venous Zhang A Waves] : no jugular venous zhang A waves [Normal Jugular Venous V Waves Present] : normal jugular venous V waves present [Respiration, Rhythm And Depth] : normal respiratory rhythm and effort [Exaggerated Use Of Accessory Muscles For Inspiration] : no accessory muscle use [Auscultation Breath Sounds / Voice Sounds] : lungs were clear to auscultation bilaterally [Heart Rate And Rhythm] : heart rate and rhythm were normal [Heart Sounds] : normal S1 and S2 [Murmurs] : no murmurs present [Abdomen Soft] : soft [FreeTextEntry1] : +2 BLLE edema [Abdomen Mass (___ Cm)] : no abdominal mass palpated [Abdomen Tenderness] : non-tender [Gait - Sufficient For Exercise Testing] : the gait was sufficient for exercise testing [Nail Clubbing] : no clubbing of the fingernails [Abnormal Walk] : normal gait [Cyanosis, Localized] : no localized cyanosis [Skin Color & Pigmentation] : normal skin color and pigmentation [Petechial Hemorrhages (___cm)] : no petechial hemorrhages [] : no rash [No Venous Stasis] : no venous stasis [No Skin Ulcers] : no skin ulcer [Skin Lesions] : no skin lesions [No Xanthoma] : no  xanthoma was observed [Affect] : the affect was normal [Oriented To Time, Place, And Person] : oriented to person, place, and time [Mood] : the mood was normal [No Anxiety] : not feeling anxious

## 2020-08-24 NOTE — HISTORY OF PRESENT ILLNESS
[FreeTextEntry1] :  past medical history of atrial fibrillation on AC, CAD s/p CABG, nonobstructive carotid dz, HTN, HLD. He has history of coronary artery bypass graft surgery. He had LIMA to LAD, SVG to PDA, OMB and OM2 in 1990. Noted to have slight anemia. Off ASA. On Eliquis. Following with PCP Dr. Fajardo.\par \par \par

## 2020-08-24 NOTE — REASON FOR VISIT
[Follow-Up - Clinic] : a clinic follow-up of [Anticoagulation] : anticoagulation [Aortic Stenosis] : aortic stenosis [Atrial Fibrillation] : atrial fibrillation [Hyperlipidemia] : hyperlipidemia [Pacemaker Evaluation] : pacemaker ~T evaluation ~C was performed [Mitral Regurgitation] : mitral regurgitation [Hypertension] : hypertension [FreeTextEntry1] : 86-year-old gentleman is here with his daughter.  Stable exertional dyspnea.  No PND orthopnea.  Stable mild ankle edema.  No palpitation dizziness lightheadedness no bleeding complications\par No recent falls\par Gradual increasing activity after fall few months ago.\par No recent hospital admission\par Stable weight and diet\par No syncopal event

## 2020-11-05 NOTE — HISTORY OF PRESENT ILLNESS
[FreeTextEntry1] : ARLENE MAS JR is a 86 year old male with a past medical history of atrial fibrillation on AC, CAD s/p CABG, nonobstructive carotid dz, HTN, HLD. He has history of coronary artery bypass graft surgery. He had LIMA to LAD, SVG to PDA, OMB and OM2 in 1990. Noted to have slight anemia. Off ASA. On Eliquis. Following with PCP Dr. Fajardo.\par \par Last seen for OV with Dr. Paulson 6/26/29. In the inteim he tripped, fractured his left hip and had a repair with Dr. Taylor. Was in [facility]. He denies chest pain, pressure, palpitations, unusual shortness of breath, orthopnea, LE edema, lightheadedness, dizziness, near syncope or syncope. Activity is limited. Walks with walker.\par \par Device check today demonstrates: Battery longevity 2 months - 17 months. Stable lead testing. No events. See device note for full details.\par \par Testing:\par \par Labs 6/15/20: Hgb 11.9, Hct 35.9, ESR 18, Trigs 94, Chol 134, LDL 46, HDL 69, ALT 17, AST 23, CRP 0.59, uric acid 6.8\par \par Echo 5/17/19: EF 60%. Mild MR. Minimal AR. Ascending aorta 4 cm. LVOT velocity time = 22cm. Mildly dilated LA. Mild concentric LVH. Moderate DD. Compared to echo of 5/14/18, no significant changes noted.\par \par Carotid u/s 5/17/2019: Nonobstructive dz. Medication (1) And Associated J-Code Units: Dupixent, 300mg

## 2020-11-24 NOTE — PROCEDURE
[No] : not [NSR] : normal sinus rhythm [Pacemaker] : pacemaker [DDD] : DDD [Lead Imp:  ___ohms] : lead impedance was [unfilled] ohms [Sensing Amplitude ___mv] : sensing amplitude was [unfilled] mv [___V @] : [unfilled] V [___ ms] : [unfilled] ms [de-identified] : Medtronic [de-identified] : Sensia [id number]. [de-identified] : [id number] [de-identified] : 8-16-10 [de-identified] :  [de-identified] : Battery longevity: 1 month - 22 months, average 10 months [de-identified] : AP 79.2%,  8.7%\par \par 2 High atrial rates: 8/25/20 for 51 minutes and 9 seconds, rate 98, and then 10/30/20 for 1 hour, 7 minutes, and 45 seconds, HR 88 bpm. Asymptomatic. On Eliquis. On Carvedilol.\par \par Settings:\par \par atrium \par sensing 0.50mv (auto)\par amplitude 1.5v (auto)\par duration 0.40ms (auto)\par \par Ventricle \par sensing 4mv (auto)\par amplitude 2.0v (auto)\par duration 0.40ms (auto)\par \par Remote interrogation in 6 weeks with an in office interrogation in 3 months\par \par F/U: DRC in 1 month, and in 2 months, then in person DCV in 3 months\par Discussed red flag symptoms, which would warrant sooner or emergent medical evaluation.\par Any questions and concerns were addressed and resolved.\par \par Sincerely,\par Nataliya Vazquez [facility]\par Patient's history, testing, and plan was reviewed with supervising physician, Dr. Patrick Valentino

## 2020-12-29 NOTE — PROCEDURE
[NSR] : normal sinus rhythm [Pacemaker] : pacemaker [DDDR] : DDDR [Lead Imp:  ___ohms] : lead impedance was [unfilled] ohms [Sensing Amplitude ___mv] : sensing amplitude was [unfilled] mv [___V @] : [unfilled] V [___ ms] : [unfilled] ms [de-identified] : Medtronic [de-identified] : Sensia [id number]. [de-identified] : [id number] [de-identified] : 8-16-10 [de-identified] :  [de-identified] : Battery longevity: Average 5 months, <1 month - 15 months [de-identified] : AP 90.2%,  14.7%\par \par 1 high atrial rate 12/22/20 at 1205 am  lasting 10 hours and 2 minutes with an average V rate of 73 bpm. \par 1 HVR 12/24/20 at 725 pm lasting 7 seconds, average V rate 180 bpm. (17 beats NSVT)\par On Eliquis. On Carvedilol. \par \par Labs 7/14/20: Hgb 12.4, HCT 37.3, K 3.8, Cr 1.32\par \par Echo 8/24/20: EF 60%.\par \par Patient notes lower extremity swelling (chronic) and new ALVA.\par \par Spoke with both patient and daughter. Advised to keep Medtronic device plugged in within 6 feet from his bedside.\par \par Plan:\par Obtain 2d echocardiogram to evaluate resting heart structure, valvular function, and LVEF. \par Obtain a pharmacological nuclear stress test to evaluate for evidence of myocardial ischemia.\par Labs have been ordered: CBC, CMP, Mag, BNP, and TSH.\par \par Settings:\par \par atrium \par sensing 0.50mv (auto)\par amplitude 1.5v (auto)\par duration 0.40ms (auto)\par \par Ventricle \par sensing 4mv (auto)\par amplitude 2.0v (auto)\par duration 0.40ms (auto)\par \par \par F/U after testing to review results (echo, nuclear stress test, and labs), DRC in 1 month, DVC in 2 months.\par Discussed red flag symptoms, which would warrant sooner or emergent medical evaluation.\par Any questions and concerns were addressed and resolved.\par \par \par Sincerely,\par Nataliya Vazquez FNP-BC\par Patient's history, testing, and plan was reviewed with supervising physician, Dr. Vicente Alexander

## 2021-01-01 ENCOUNTER — NON-APPOINTMENT (OUTPATIENT)
Age: 86
End: 2021-01-01

## 2021-01-01 ENCOUNTER — APPOINTMENT (OUTPATIENT)
Dept: CARDIOLOGY | Facility: CLINIC | Age: 86
End: 2021-01-01
Payer: MEDICARE

## 2021-01-01 ENCOUNTER — APPOINTMENT (OUTPATIENT)
Dept: CARDIOLOGY | Facility: CLINIC | Age: 86
End: 2021-01-01

## 2021-01-01 ENCOUNTER — TRANSCRIPTION ENCOUNTER (OUTPATIENT)
Age: 86
End: 2021-01-01

## 2021-01-01 VITALS
HEART RATE: 83 BPM | SYSTOLIC BLOOD PRESSURE: 122 MMHG | BODY MASS INDEX: 30.31 KG/M2 | OXYGEN SATURATION: 97 % | HEIGHT: 68 IN | DIASTOLIC BLOOD PRESSURE: 70 MMHG | WEIGHT: 200 LBS

## 2021-01-01 VITALS
BODY MASS INDEX: 28.79 KG/M2 | WEIGHT: 190 LBS | OXYGEN SATURATION: 95 % | HEART RATE: 60 BPM | HEIGHT: 68 IN | SYSTOLIC BLOOD PRESSURE: 100 MMHG | DIASTOLIC BLOOD PRESSURE: 60 MMHG

## 2021-01-01 DIAGNOSIS — I25.10 ATHEROSCLEROTIC HEART DISEASE OF NATIVE CORONARY ARTERY W/OUT ANGINA PECTORIS: ICD-10-CM

## 2021-01-01 DIAGNOSIS — Z95.0 PRESENCE OF CARDIAC PACEMAKER: ICD-10-CM

## 2021-01-01 DIAGNOSIS — D64.9 ANEMIA, UNSPECIFIED: ICD-10-CM

## 2021-01-01 DIAGNOSIS — E78.5 HYPERLIPIDEMIA, UNSPECIFIED: ICD-10-CM

## 2021-01-01 DIAGNOSIS — R94.31 ABNORMAL ELECTROCARDIOGRAM [ECG] [EKG]: ICD-10-CM

## 2021-01-01 DIAGNOSIS — I10 ESSENTIAL (PRIMARY) HYPERTENSION: ICD-10-CM

## 2021-01-01 DIAGNOSIS — Z95.1 PRESENCE OF AORTOCORONARY BYPASS GRAFT: ICD-10-CM

## 2021-01-01 DIAGNOSIS — I48.91 UNSPECIFIED ATRIAL FIBRILLATION: ICD-10-CM

## 2021-01-01 DIAGNOSIS — I08.0 RHEUMATIC DISORDERS OF BOTH MITRAL AND AORTIC VALVES: ICD-10-CM

## 2021-01-01 DIAGNOSIS — I49.8 OTHER SPECIFIED CARDIAC ARRHYTHMIAS: ICD-10-CM

## 2021-01-01 PROCEDURE — A9502: CPT

## 2021-01-01 PROCEDURE — 93279 PRGRMG DEV EVAL PM/LDLS PM: CPT

## 2021-01-01 PROCEDURE — 93015 CV STRESS TEST SUPVJ I&R: CPT

## 2021-01-01 PROCEDURE — 93280 PM DEVICE PROGR EVAL DUAL: CPT

## 2021-01-01 PROCEDURE — 99214 OFFICE O/P EST MOD 30 MIN: CPT

## 2021-01-01 PROCEDURE — 93306 TTE W/DOPPLER COMPLETE: CPT

## 2021-01-01 PROCEDURE — 78452 HT MUSCLE IMAGE SPECT MULT: CPT

## 2021-01-01 RX ORDER — FUROSEMIDE 20 MG/1
20 TABLET ORAL
Qty: 30 | Refills: 0 | Status: ACTIVE | COMMUNITY
Start: 2020-01-01

## 2021-01-01 RX ORDER — ATORVASTATIN CALCIUM 20 MG/1
20 TABLET, FILM COATED ORAL
Qty: 30 | Refills: 1 | Status: DISCONTINUED | COMMUNITY
Start: 2020-01-01 | End: 2021-01-01

## 2021-01-01 RX ORDER — MUPIROCIN 20 MG/G
2 OINTMENT TOPICAL
Qty: 22 | Refills: 0 | Status: ACTIVE | COMMUNITY
Start: 2021-01-01

## 2021-01-01 RX ORDER — POTASSIUM CHLORIDE 750 MG/1
10 TABLET, EXTENDED RELEASE ORAL
Qty: 30 | Refills: 0 | Status: ACTIVE | COMMUNITY
Start: 2020-01-01

## 2021-01-01 RX ORDER — ALBUTEROL SULFATE 90 UG/1
108 (90 BASE) INHALANT RESPIRATORY (INHALATION)
Qty: 6 | Refills: 0 | Status: ACTIVE | COMMUNITY
Start: 2021-01-01

## 2021-01-01 RX ORDER — FAMOTIDINE 20 MG/1
20 TABLET, FILM COATED ORAL
Refills: 0 | Status: ACTIVE | COMMUNITY

## 2021-01-01 RX ORDER — CLINDAMYCIN HYDROCHLORIDE 150 MG/1
150 CAPSULE ORAL
Qty: 20 | Refills: 0 | Status: DISCONTINUED | COMMUNITY
Start: 2020-01-01 | End: 2021-01-01

## 2021-01-01 RX ORDER — ASPIRIN 81 MG
81 TABLET, DELAYED RELEASE (ENTERIC COATED) ORAL DAILY
Refills: 0 | Status: ACTIVE | COMMUNITY

## 2021-01-01 RX ORDER — PREDNISONE 1 MG/1
1 TABLET ORAL DAILY
Refills: 0 | Status: ACTIVE | COMMUNITY

## 2021-01-01 RX ORDER — SIMVASTATIN 20 MG/1
20 TABLET, FILM COATED ORAL
Refills: 0 | Status: ACTIVE | COMMUNITY

## 2021-01-28 NOTE — HISTORY OF PRESENT ILLNESS
[FreeTextEntry1] : ARLENE MAS JR is a 86 year old male with a past medical history of atrial fibrillation on AC, CAD s/p CABG, nonobstructive carotid dz, HTN, HLD. He has history of coronary artery bypass graft surgery. He had LIMA to LAD, SVG to PDA, OMB and OM2 in 1990. Noted to have slight anemia. Off ASA. On Eliquis. Following with PCP Dr. Fajardo.\par \par Last seen for OV with Dr. Paulson 6/26/29. In the inteim he tripped, fractured his left hip and had a repair with Dr. Taylor. Was in [facility]. He denies chest pain, pressure, palpitations, unusual shortness of breath, orthopnea, LE edema, lightheadedness, dizziness, near syncope or syncope. Activity is limited. Walks with walker.\par \par Device check today demonstrates: Battery longevity 2 months - 17 months. Stable lead testing. No events. See device note for full details.\par \par Testing:\par \par Labs 6/15/20: Hgb 11.9, Hct 35.9, ESR 18, Trigs 94, Chol 134, LDL 46, HDL 69, ALT 17, AST 23, CRP 0.59, uric acid 6.8\par \par Echo 5/17/19: EF 60%. Mild MR. Minimal AR. Ascending aorta 4 cm. LVOT velocity time = 22cm. Mildly dilated LA. Mild concentric LVH. Moderate DD. Compared to echo of 5/14/18, no significant changes noted.\par \par Carotid u/s 5/17/2019: Nonobstructive dz.

## 2021-01-28 NOTE — PHYSICAL EXAM
[General Appearance - Well Developed] : well developed [Normal Appearance] : normal appearance [Well Groomed] : well groomed [General Appearance - Well Nourished] : well nourished [No Deformities] : no deformities [General Appearance - In No Acute Distress] : no acute distress [Normal Conjunctiva] : the conjunctiva exhibited no abnormalities [Eyelids - No Xanthelasma] : the eyelids demonstrated no xanthelasmas [Normal Oral Mucosa] : normal oral mucosa [No Oral Pallor] : no oral pallor [No Oral Cyanosis] : no oral cyanosis [Normal Jugular Venous A Waves Present] : normal jugular venous A waves present [Normal Jugular Venous V Waves Present] : normal jugular venous V waves present [No Jugular Venous Zhang A Waves] : no jugular venous zhang A waves [Respiration, Rhythm And Depth] : normal respiratory rhythm and effort [Exaggerated Use Of Accessory Muscles For Inspiration] : no accessory muscle use [Auscultation Breath Sounds / Voice Sounds] : lungs were clear to auscultation bilaterally [Heart Rate And Rhythm] : heart rate and rhythm were normal [Heart Sounds] : normal S1 and S2 [Murmurs] : no murmurs present [Abdomen Soft] : soft [Abdomen Tenderness] : non-tender [Abdomen Mass (___ Cm)] : no abdominal mass palpated [Abnormal Walk] : normal gait [Gait - Sufficient For Exercise Testing] : the gait was sufficient for exercise testing [Nail Clubbing] : no clubbing of the fingernails [Cyanosis, Localized] : no localized cyanosis [Petechial Hemorrhages (___cm)] : no petechial hemorrhages [Skin Color & Pigmentation] : normal skin color and pigmentation [] : no rash [No Venous Stasis] : no venous stasis [Skin Lesions] : no skin lesions [No Skin Ulcers] : no skin ulcer [No Xanthoma] : no  xanthoma was observed [Oriented To Time, Place, And Person] : oriented to person, place, and time [Affect] : the affect was normal [Mood] : the mood was normal [No Anxiety] : not feeling anxious [FreeTextEntry1] : +2 BLLE edema

## 2021-01-28 NOTE — PROCEDURE
[NSR] : normal sinus rhythm [Pacemaker] : pacemaker [DDDR] : DDDR [Lead Imp:  ___ohms] : lead impedance was [unfilled] ohms [Sensing Amplitude ___mv] : sensing amplitude was [unfilled] mv [___V @] : [unfilled] V [___ ms] : [unfilled] ms [de-identified] : Medtronic [de-identified] : Sensia [id number]. [de-identified] : [id number] [de-identified] : 8-16-[birth year] [de-identified] :  [de-identified] : Battery longevity: Average 5 months, <1 month - 15 months [de-identified] : AP 90.2%,  14.7%\par \par 1 high atrial rate 12/22/20 at 1205 am  lasting 10 hours and 2 minutes with an average V rate of 73 bpm. \par 1 HVR 12/24/20 at 725 pm lasting 7 seconds, average V rate 180 bpm. (17 beats NSVT)\par On Eliquis. On Carvedilol. \par \par Labs 7/14/20: Hgb 12.4, HCT 37.3, K 3.8, Cr 1.32\par \par Echo 8/24/20: EF 60%.\par \par Patient notes lower extremity swelling (chronic) and new ALVA.\par \par Spoke with both patient and daughter. Advised to keep Medtronic device plugged in within 6 feet from his bedside.\par \par Plan:\par Obtain 2d echocardiogram to evaluate resting heart structure, valvular function, and LVEF. \par Obtain a pharmacological nuclear stress test to evaluate for evidence of myocardial ischemia.\par Labs have been ordered: CBC, CMP, Mag, BNP, and TSH.\par \par Settings:\par \par atrium \par sensing 0.50mv (auto)\par amplitude 1.5v (auto)\par duration 0.40ms (auto)\par \par Ventricle \par sensing 4mv (auto)\par amplitude 2.0v (auto)\par duration 0.40ms (auto)\par \par \par F/U after testing to review results (echo, nuclear stress test, and labs), DRC in 1 month, DVC in 2 months.\par Discussed red flag symptoms, which would warrant sooner or emergent medical evaluation.\par Any questions and concerns were addressed and resolved.\par \par \par Sincerely,\par Nataliya Vazquez FNP-BC\par Patient's history, testing, and plan was reviewed with supervising physician, Dr. Vicente Alexander

## 2021-01-28 NOTE — ASSESSMENT
[FreeTextEntry1] : ARLENE MAS JR is a 86 year old M who presents today Jun 29, 2020 with the above history and the following active issues:\par \par Atrial fibrillation on AC: Paroxysmal. No events on today's device check. No complaints of bleeding.\par \par CAD s/p CABG: Not on ASA secondary to anemia. Continue Coreg. Continue Procardia XL. Continue Cardura.\par \par Lower extremity edema: Obtain 2d echocardiogram to evaluate resting heart structure, valvular function, and LVEF. Monitor LVOT. Low salt diet.\par \par HTN: Well controlled today. Meds as above.\par \par HLD: Tolerating Lipitor.\par \par Anemia: Following with PCP Dr. Fajardo.\par \par We will interrogate pacemaker today to check the battery.  Otherwise no changes.  Cardiac follow-up 6 months and as needed.

## 2021-01-29 NOTE — PROCEDURE
[NSR] : normal sinus rhythm [Pacemaker] : pacemaker [DDDR] : DDDR [Lead Imp:  ___ohms] : lead impedance was [unfilled] ohms [Sensing Amplitude ___mv] : sensing amplitude was [unfilled] mv [___V @] : [unfilled] V [___ ms] : [unfilled] ms [de-identified] : Medtronic [de-identified] : Sensia [id number]. [de-identified] : [id number] [de-identified] : 8-16-10 [de-identified] :  [de-identified] : Battery longevity:Est 1 month [de-identified] : AP 99%,  17%\par \par On Eliquis. On Carvedilol. \par \par Labs 7/14/20: Hgb 12.4, HCT 37.3, K 3.8, Cr 1.32\par \par Echo 8/24/20: EF 60%.\par \par Patient notes lower extremity swelling (chronic) and new ALVA.\par \par Spoke with both patient and daughter. Advised to keep Medtronic device plugged in within 6 feet from his bedside.\par \par Plan:\par Obtain 2d echocardiogram to evaluate resting heart structure, valvular function, and LVEF. \par Obtain a pharmacological nuclear stress test to evaluate for evidence of myocardial ischemia.\par Labs have been ordered: CBC, CMP, Mag, BNP, and TSH.\par \par Settings:\par \par atrium \par sensing 0.50mv (auto)\par amplitude 1.5v (auto)\par duration 0.40ms (auto)\par \par Ventricle \par sensing 4mv (auto)\par amplitude 2.0v (auto)\par duration 0.40ms (auto)\par \par OV with BS today to review recent cardiac testing\par PPM interrogation in 1 month\par \par Sincerely,\par \par Nathalie Grant PA-C\par Patients history, testing and plan reviewed with supervising MD: Dr. Tete Jackson

## 2021-02-25 PROBLEM — E78.5 HYPERLIPIDEMIA: Status: ACTIVE | Noted: 2017-12-12

## 2021-02-25 PROBLEM — I08.0 AORTIC STENOSIS WITH MITRAL AND AORTIC INSUFFICIENCY: Status: ACTIVE | Noted: 2020-01-01

## 2021-02-25 PROBLEM — I49.8 OTHER SPECIFIED CARDIAC ARRHYTHMIAS: Status: ACTIVE | Noted: 2018-04-26

## 2021-02-25 PROBLEM — I10 ESSENTIAL (PRIMARY) HYPERTENSION: Status: ACTIVE | Noted: 2018-04-26

## 2021-02-25 PROBLEM — R94.31 ABNORMAL EKG: Status: ACTIVE | Noted: 2018-05-24

## 2021-02-25 PROBLEM — I48.91 A-FIB: Status: ACTIVE | Noted: 2019-02-15

## 2021-02-25 PROBLEM — Z95.0 PRESENCE OF CARDIAC PACEMAKER: Status: ACTIVE | Noted: 2018-04-26

## 2021-02-25 PROBLEM — D64.9 ANEMIA: Status: ACTIVE | Noted: 2018-04-26

## 2021-02-25 PROBLEM — I25.10 CAD (CORONARY ARTERY DISEASE): Status: ACTIVE | Noted: 2019-06-26

## 2021-02-25 PROBLEM — Z95.1 PRESENCE OF AORTOCORONARY BYPASS GRAFT: Status: ACTIVE | Noted: 2018-04-26

## 2021-02-25 NOTE — ASSESSMENT
[FreeTextEntry1] : ARLENE MAS JR is a 86 year old M who presents today with the above history and the following active issues:\par \par Atrial fibrillation on AC: Paroxysmal. No events on today's device check. No complaints of bleeding.\par \par CAD s/p CABG: Not on ASA secondary to anemia. Continue Coreg. Continue Procardia XL. Continue Cardura.\par \par Lower extremity edema: Obtain 2d echocardiogram to evaluate resting heart structure, valvular function, and LVEF. Monitor LVOT. Low salt diet.\par \par HTN: Well controlled today. Meds as above.\par \par HLD: Tolerating Lipitor.\par \par Anemia: Following with PCP Dr. Fajardo.\par \par FU with EP reg generator change

## 2021-02-25 NOTE — PROCEDURE
[NSR] : normal sinus rhythm [Pacemaker] : pacemaker [DDDR] : DDDR [Lead Imp:  ___ohms] : lead impedance was [unfilled] ohms [Sensing Amplitude ___mv] : sensing amplitude was [unfilled] mv [___V @] : [unfilled] V [___ ms] : [unfilled] ms [de-identified] : Medtronic [de-identified] : Sensia [id number]. [de-identified] : [id number] [de-identified] : 8-16-10 [de-identified] :  [de-identified] : Battery longevity: Average 5 months, <1 month - 15 months [de-identified] : AP 90.2%,  14.7%\par \par 1 high atrial rate 12/22/20 at 1205 am  lasting 10 hours and 2 minutes with an average V rate of 73 bpm. \par 1 HVR 12/24/20 at 725 pm lasting 7 seconds, average V rate 180 bpm. (17 beats NSVT)\par On Eliquis. On Carvedilol. \par \par Labs 7/14/20: Hgb 12.4, HCT 37.3, K 3.8, Cr 1.32\par \par Echo 8/24/20: EF 60%.\par \par Patient notes lower extremity swelling (chronic) and new ALVA.\par \par Spoke with both patient and daughter. Advised to keep Medtronic device plugged in within 6 feet from his bedside.\par \par Plan:\par Obtain 2d echocardiogram to evaluate resting heart structure, valvular function, and LVEF. \par Obtain a pharmacological nuclear stress test to evaluate for evidence of myocardial ischemia.\par Labs have been ordered: CBC, CMP, Mag, BNP, and TSH.\par \par Settings:\par \par atrium \par sensing 0.50mv (auto)\par amplitude 1.5v (auto)\par duration 0.40ms (auto)\par \par Ventricle \par sensing 4mv (auto)\par amplitude 2.0v (auto)\par duration 0.40ms (auto)\par \par \par F/U after testing to review results (echo, nuclear stress test, and labs), DRC in 1 month, DVC in 2 months.\par Discussed red flag symptoms, which would warrant sooner or emergent medical evaluation.\par Any questions and concerns were addressed and resolved.\par \par \par Sincerely,\par Nataliya Vazquez FNP-BC\par Patient's history, testing, and plan was reviewed with supervising physician, Dr. Vicente Alexander

## 2021-02-25 NOTE — HISTORY OF PRESENT ILLNESS
[FreeTextEntry1] : ARLENE MAS JR is a 86 year old male with a past medical history of atrial fibrillation on AC, CAD s/p CABG, nonobstructive carotid dz, HTN, HLD. He has history of coronary artery bypass graft surgery. He had LIMA to LAD, SVG to PDA, OMB and OM2 in 1990. Noted to have slight anemia. Off ASA. On Eliquis. Following with PCP Dr. Fajardo.\par \par \par Device check today demonstrates: Battery longevity 3 months\par \par Testing:\par \par Labs 6/15/20: Hgb 11.9, Hct 35.9, ESR 18, Trigs 94, Chol 134, LDL 46, HDL 69, ALT 17, AST 23, CRP 0.59, uric acid 6.8\par \par Echo 5/17/19: EF 60%. Mild MR. Minimal AR. Ascending aorta 4 cm. LVOT velocity time = 22cm. Mildly dilated LA. Mild concentric LVH. Moderate DD. Compared to echo of 5/14/18, no significant changes noted.\par \par Carotid u/s 5/17/2019: Nonobstructive dz.

## 2021-02-25 NOTE — PHYSICAL EXAM
[General Appearance - Well Developed] : well developed [Normal Appearance] : normal appearance [Well Groomed] : well groomed [General Appearance - Well Nourished] : well nourished [No Deformities] : no deformities [General Appearance - In No Acute Distress] : no acute distress [Normal Conjunctiva] : the conjunctiva exhibited no abnormalities [Eyelids - No Xanthelasma] : the eyelids demonstrated no xanthelasmas [Normal Oral Mucosa] : normal oral mucosa [No Oral Pallor] : no oral pallor [No Oral Cyanosis] : no oral cyanosis [Normal Jugular Venous A Waves Present] : normal jugular venous A waves present [Normal Jugular Venous V Waves Present] : normal jugular venous V waves present [No Jugular Venous Zhang A Waves] : no jugular venous zhang A waves [Respiration, Rhythm And Depth] : normal respiratory rhythm and effort [Exaggerated Use Of Accessory Muscles For Inspiration] : no accessory muscle use [Auscultation Breath Sounds / Voice Sounds] : lungs were clear to auscultation bilaterally [Heart Rate And Rhythm] : heart rate and rhythm were normal [Heart Sounds] : normal S1 and S2 [Murmurs] : no murmurs present [FreeTextEntry1] : +2 BLLE edema [Abdomen Soft] : soft [Abdomen Tenderness] : non-tender [Abdomen Mass (___ Cm)] : no abdominal mass palpated [Abnormal Walk] : normal gait [Gait - Sufficient For Exercise Testing] : the gait was sufficient for exercise testing [Nail Clubbing] : no clubbing of the fingernails [Cyanosis, Localized] : no localized cyanosis [Petechial Hemorrhages (___cm)] : no petechial hemorrhages [Skin Color & Pigmentation] : normal skin color and pigmentation [] : no rash [No Venous Stasis] : no venous stasis [Skin Lesions] : no skin lesions [No Skin Ulcers] : no skin ulcer [No Xanthoma] : no  xanthoma was observed [Oriented To Time, Place, And Person] : oriented to person, place, and time [Affect] : the affect was normal [Mood] : the mood was normal [No Anxiety] : not feeling anxious

## 2021-02-26 NOTE — PROCEDURE
[NSR] : normal sinus rhythm [Pacemaker] : pacemaker [VVI] : VVI [Sensing Amplitude ___mv] : sensing amplitude was [unfilled] mv [Lead Imp:  ___ohms] : lead impedance was [unfilled] ohms [___V @] : [unfilled] V [___ ms] : [unfilled] ms [de-identified] : Medtronic [de-identified] : Sensia [id number]. [de-identified] : [id number] [de-identified] : 8-16-[birth year] [de-identified] : 65 [de-identified] : [facility] since 2/11/21 [de-identified] : \par On Eliquis. On Carvedilol. \par Echo 8/24/20: EF 60%.\par \par PPM at [facility] since 2/11/21\par Office visit with Dr. Jackson today\par Recommend EP consultation for PPM generator change\par \par Red flag symptoms which would warrant sooner emergent evaluation reviewed with the patient. \par Questions and concerns were addressed and answered. \par \par Sincerely,\par \par Nathalie Grant PA-C\par Patients history, testing and plan reviewed with supervising MD: Dr. Tete Jackson

## 2021-03-10 ENCOUNTER — APPOINTMENT (OUTPATIENT)
Dept: ELECTROPHYSIOLOGY | Facility: CLINIC | Age: 86
End: 2021-03-10